# Patient Record
Sex: FEMALE | Race: WHITE | NOT HISPANIC OR LATINO | URBAN - METROPOLITAN AREA
[De-identification: names, ages, dates, MRNs, and addresses within clinical notes are randomized per-mention and may not be internally consistent; named-entity substitution may affect disease eponyms.]

---

## 2020-06-01 ENCOUNTER — OFFICE (OUTPATIENT)
Dept: URBAN - METROPOLITAN AREA CLINIC 19 | Facility: CLINIC | Age: 85
End: 2020-06-01
Payer: MEDICARE

## 2020-06-01 VITALS
HEART RATE: 63 BPM | SYSTOLIC BLOOD PRESSURE: 123 MMHG | WEIGHT: 110 LBS | DIASTOLIC BLOOD PRESSURE: 51 MMHG | HEIGHT: 66 IN

## 2020-06-01 DIAGNOSIS — R10.9 UNSPECIFIED ABDOMINAL PAIN: ICD-10-CM

## 2020-06-01 DIAGNOSIS — K86.2 CYST OF PANCREAS: ICD-10-CM

## 2020-06-01 DIAGNOSIS — R19.8 OTHER SPECIFIED SYMPTOMS AND SIGNS INVOLVING THE DIGESTIVE S: ICD-10-CM

## 2020-06-01 DIAGNOSIS — R13.10 DYSPHAGIA, UNSPECIFIED: ICD-10-CM

## 2020-06-01 DIAGNOSIS — D63.8 ANEMIA IN OTHER CHRONIC DISEASES CLASSIFIED ELSEWHERE: ICD-10-CM

## 2020-06-01 LAB
AMYLASE: 260 U/L — HIGH (ref 31–110)
CBC, PLATELET, NO DIFFERENTIAL: HEMATOCRIT: 32.8 % — LOW (ref 34–46.6)
CBC, PLATELET, NO DIFFERENTIAL: HEMOGLOBIN: 10.6 G/DL — LOW (ref 11.1–15.9)
CBC, PLATELET, NO DIFFERENTIAL: MCH: 29.7 PG (ref 26.6–33)
CBC, PLATELET, NO DIFFERENTIAL: MCHC: 32.3 G/DL (ref 31.5–35.7)
CBC, PLATELET, NO DIFFERENTIAL: MCV: 92 FL (ref 79–97)
CBC, PLATELET, NO DIFFERENTIAL: NRBC: (no result)
CBC, PLATELET, NO DIFFERENTIAL: PLATELETS: (no result) X10E3/UL
CBC, PLATELET, NO DIFFERENTIAL: RBC: 3.57 X10E6/UL — LOW (ref 3.77–5.28)
CBC, PLATELET, NO DIFFERENTIAL: RDW: 13.8 % (ref 11.7–15.4)
CBC, PLATELET, NO DIFFERENTIAL: WBC: 4.9 X10E3/UL (ref 3.4–10.8)
COMP. METABOLIC PANEL (14): A/G RATIO: 1.8 (ref 1.2–2.2)
COMP. METABOLIC PANEL (14): ALBUMIN: 4.2 G/DL (ref 3.5–4.6)
COMP. METABOLIC PANEL (14): ALKALINE PHOSPHATASE: 34 IU/L — LOW (ref 39–117)
COMP. METABOLIC PANEL (14): ALT (SGPT): 16 IU/L (ref 0–32)
COMP. METABOLIC PANEL (14): AST (SGOT): 22 IU/L (ref 0–40)
COMP. METABOLIC PANEL (14): BILIRUBIN, TOTAL: 0.3 MG/DL (ref 0–1.2)
COMP. METABOLIC PANEL (14): BUN/CREATININE RATIO: 23 (ref 12–28)
COMP. METABOLIC PANEL (14): BUN: 14 MG/DL (ref 10–36)
COMP. METABOLIC PANEL (14): CALCIUM: 9.5 MG/DL (ref 8.7–10.3)
COMP. METABOLIC PANEL (14): CARBON DIOXIDE, TOTAL: 28 MMOL/L (ref 20–29)
COMP. METABOLIC PANEL (14): CHLORIDE: 102 MMOL/L (ref 96–106)
COMP. METABOLIC PANEL (14): CREATININE: 0.61 MG/DL (ref 0.57–1)
COMP. METABOLIC PANEL (14): EGFR IF AFRICN AM: 92 ML/MIN/1.73 (ref 59–?)
COMP. METABOLIC PANEL (14): EGFR IF NONAFRICN AM: 80 ML/MIN/1.73 (ref 59–?)
COMP. METABOLIC PANEL (14): GLOBULIN, TOTAL: 2.3 G/DL (ref 1.5–4.5)
COMP. METABOLIC PANEL (14): GLUCOSE: 112 MG/DL — HIGH (ref 65–99)
COMP. METABOLIC PANEL (14): POTASSIUM: 4.1 MMOL/L (ref 3.5–5.2)
COMP. METABOLIC PANEL (14): PROTEIN, TOTAL: 6.5 G/DL (ref 6–8.5)
COMP. METABOLIC PANEL (14): SODIUM: 143 MMOL/L (ref 134–144)
HEMATOLOGY COMMENTS: (no result)
LIPASE: 34 U/L (ref 14–85)

## 2020-06-01 PROCEDURE — 99204 OFFICE O/P NEW MOD 45 MIN: CPT

## 2020-06-01 NOTE — SERVICEHPINOTES
89-year-old white female returns with her daughter for evaluation of a nonspecific cyst found on recent CT abdomen/pelvis.  For the last 2-4 weeks she has had right-sided abdominal pain radiating to her back.  She initially presented to her PCP, Jill Khalil MD who apparently geena routine blood work including pancreatic enzymes and ordered a CT abdomen/pelvis (records requested).  She did have an elevated amylase and CT abdomen/pelvis on 5/22/20 found “interval development of a small 13 x 14 millimeter cystic focus along the edge of the anterior/inferior pancreatic head.  This may represent a small pseudocyst but is nonspecific.  Alternatively, it could represent an IPMN.  Consider follow-up MRCP/pancreatic protocol to evaluate both the character of this cyst, and the potential for an occult common duct stone which could have caused an episode of pancreatitis.” Around 2-3 weeks ago, when this abdominal pain began, it was acute, moderate to severe and constant.  It has significantly improved in the last few weeks and is more intermittent and less severe.  She also reports a change in her bowel habits over the last several months.  She initially started out with “dark, black diarrhea” that is now transitioned to “pink colored stool” for the last 2-4 weeks.  She reports incomplete emptying and has 1-2 bowel movements a day.  She has had some progressive dysphagia, but denies aspiration.  She denies any unintentional weight loss or loss of appetite.  She denies reflux, heartburn or nausea. She denies NSAID use.  She thinks she has a remote history of PUD, but does not remember details.She was last here for colonoscopy 4/1/16 which found diverticulosis coli and removed a small inflammatory polyp. 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 She developed some rib pain, and a PET scan 2/25/16 was unremarkable except for "focal increased activity in the proximal ascending colon with suggestion of wall thickening". This was compared to a unremarkable PET scan 11/12/15. She has some fecal urgency and passes 2-3 "mushy" stools per day. Colonoscopy 9/12/09 found only diverticulosis coli. There was a mild increase in lymphocytes on random colon biopsies, but this was not definitive for microscopic colitis. Collagenous colitis was found via random biopsies on colonoscopy 11/1/99. A GI workup in 2003 included "extended" upper endoscopy and colonoscopy (no random colon biopsies taken), CT abd/pelvis and SBFT which found diverticula, esophagogastritis and hemorrhoids. Family history is negative for IBD or colon neoplasm.

## 2020-06-01 NOTE — SERVICENOTES
The patient's assessment was reviewed with Dr. Lopez and a collaborative plan of care was established.

## 2020-06-10 ENCOUNTER — OFFICE (OUTPATIENT)
Dept: URBAN - METROPOLITAN AREA CLINIC 19 | Facility: CLINIC | Age: 85
End: 2020-06-10

## 2020-06-11 ENCOUNTER — OFFICE (OUTPATIENT)
Dept: URBAN - METROPOLITAN AREA CLINIC 19 | Facility: CLINIC | Age: 85
End: 2020-06-11
Payer: MEDICARE

## 2020-06-11 DIAGNOSIS — R19.4 CHANGE IN BOWEL HABIT: ICD-10-CM

## 2020-06-11 PROCEDURE — 82272 OCCULT BLD FECES 1-3 TESTS: CPT

## 2020-06-30 ENCOUNTER — AMBULATORY SURGICAL CENTER (OUTPATIENT)
Dept: URBAN - METROPOLITAN AREA SURGERY 3 | Facility: SURGERY | Age: 85
End: 2020-06-30
Payer: MEDICARE

## 2020-06-30 ENCOUNTER — AMBULATORY SURGICAL CENTER (OUTPATIENT)
Dept: URBAN - METROPOLITAN AREA SURGERY 3 | Facility: SURGERY | Age: 85
End: 2020-06-30
Payer: OTHER GOVERNMENT

## 2020-06-30 ENCOUNTER — OFFICE (OUTPATIENT)
Dept: URBAN - METROPOLITAN AREA PATHOLOGY 22 | Facility: PATHOLOGY | Age: 85
End: 2020-06-30
Payer: OTHER GOVERNMENT

## 2020-06-30 VITALS
DIASTOLIC BLOOD PRESSURE: 63 MMHG | HEIGHT: 66 IN | HEIGHT: 66 IN | HEART RATE: 70 BPM | RESPIRATION RATE: 15 BRPM | HEART RATE: 67 BPM | TEMPERATURE: 97.3 F | SYSTOLIC BLOOD PRESSURE: 148 MMHG | WEIGHT: 104 LBS | SYSTOLIC BLOOD PRESSURE: 112 MMHG | SYSTOLIC BLOOD PRESSURE: 156 MMHG | HEART RATE: 66 BPM | DIASTOLIC BLOOD PRESSURE: 63 MMHG | DIASTOLIC BLOOD PRESSURE: 62 MMHG | RESPIRATION RATE: 16 BRPM | SYSTOLIC BLOOD PRESSURE: 154 MMHG | OXYGEN SATURATION: 99 % | HEART RATE: 66 BPM | DIASTOLIC BLOOD PRESSURE: 45 MMHG | HEART RATE: 67 BPM | SYSTOLIC BLOOD PRESSURE: 112 MMHG | TEMPERATURE: 97.3 F | TEMPERATURE: 97.2 F | SYSTOLIC BLOOD PRESSURE: 124 MMHG | DIASTOLIC BLOOD PRESSURE: 45 MMHG | HEART RATE: 70 BPM | RESPIRATION RATE: 18 BRPM | SYSTOLIC BLOOD PRESSURE: 124 MMHG | DIASTOLIC BLOOD PRESSURE: 64 MMHG | WEIGHT: 104 LBS | DIASTOLIC BLOOD PRESSURE: 65 MMHG | OXYGEN SATURATION: 100 % | OXYGEN SATURATION: 100 % | RESPIRATION RATE: 17 BRPM | RESPIRATION RATE: 18 BRPM | RESPIRATION RATE: 16 BRPM | DIASTOLIC BLOOD PRESSURE: 64 MMHG | DIASTOLIC BLOOD PRESSURE: 62 MMHG | OXYGEN SATURATION: 99 % | RESPIRATION RATE: 17 BRPM | DIASTOLIC BLOOD PRESSURE: 65 MMHG | HEART RATE: 68 BPM | HEART RATE: 68 BPM | SYSTOLIC BLOOD PRESSURE: 156 MMHG | SYSTOLIC BLOOD PRESSURE: 154 MMHG | TEMPERATURE: 97.2 F | SYSTOLIC BLOOD PRESSURE: 148 MMHG | HEART RATE: 75 BPM | HEART RATE: 75 BPM | RESPIRATION RATE: 15 BRPM

## 2020-06-30 DIAGNOSIS — K31.89 OTHER DISEASES OF STOMACH AND DUODENUM: ICD-10-CM

## 2020-06-30 DIAGNOSIS — K20.9 ESOPHAGITIS, UNSPECIFIED: ICD-10-CM

## 2020-06-30 DIAGNOSIS — R13.10 DYSPHAGIA, UNSPECIFIED: ICD-10-CM

## 2020-06-30 PROBLEM — R10.9: Status: ACTIVE | Noted: 2020-06-30

## 2020-06-30 PROBLEM — K20.8 OTHER ESOPHAGITIS: Status: ACTIVE | Noted: 2020-06-30

## 2020-06-30 LAB
AMYLASE: 243 U/L — HIGH (ref 31–110)
AMYLASE: 243 U/L — HIGH (ref 31–110)
LIPASE: 65 U/L (ref 14–85)
LIPASE: 65 U/L (ref 14–85)

## 2020-06-30 PROCEDURE — 88342 IMHCHEM/IMCYTCHM 1ST ANTB: CPT | Performed by: INTERNAL MEDICINE

## 2020-06-30 PROCEDURE — 88305 TISSUE EXAM BY PATHOLOGIST: CPT | Performed by: INTERNAL MEDICINE

## 2020-06-30 PROCEDURE — 43248 EGD GUIDE WIRE INSERTION: CPT | Performed by: INTERNAL MEDICINE

## 2020-06-30 PROCEDURE — 88313 SPECIAL STAINS GROUP 2: CPT | Performed by: INTERNAL MEDICINE

## 2020-06-30 PROCEDURE — 43239 EGD BIOPSY SINGLE/MULTIPLE: CPT | Mod: 59 | Performed by: INTERNAL MEDICINE

## 2020-06-30 PROCEDURE — G8907 PT DOC NO EVENTS ON DISCHARG: HCPCS | Performed by: INTERNAL MEDICINE

## 2020-06-30 PROCEDURE — G8918 PT W/O PREOP ORDER IV AB PRO: HCPCS | Performed by: INTERNAL MEDICINE

## 2020-06-30 RX ORDER — PANTOPRAZOLE SODIUM 40 MG/1
40 TABLET, DELAYED RELEASE ORAL
Qty: 30 | Refills: 5 | Status: ACTIVE
Start: 2020-06-30

## 2020-07-24 ENCOUNTER — OFFICE (OUTPATIENT)
Dept: URBAN - METROPOLITAN AREA CLINIC 19 | Facility: CLINIC | Age: 85
End: 2020-07-24

## 2020-07-24 VITALS
SYSTOLIC BLOOD PRESSURE: 143 MMHG | HEIGHT: 66 IN | HEART RATE: 72 BPM | DIASTOLIC BLOOD PRESSURE: 50 MMHG | WEIGHT: 106 LBS

## 2020-07-24 DIAGNOSIS — K21.9 GASTRO-ESOPHAGEAL REFLUX DISEASE WITHOUT ESOPHAGITIS: ICD-10-CM

## 2020-07-24 DIAGNOSIS — K92.1 MELENA: ICD-10-CM

## 2020-07-24 DIAGNOSIS — R10.9 UNSPECIFIED ABDOMINAL PAIN: ICD-10-CM

## 2020-07-24 DIAGNOSIS — K52.89 OTHER SPECIFIED NONINFECTIVE GASTROENTERITIS AND COLITIS: ICD-10-CM

## 2020-07-24 DIAGNOSIS — D63.8 ANEMIA IN OTHER CHRONIC DISEASES CLASSIFIED ELSEWHERE: ICD-10-CM

## 2020-07-24 DIAGNOSIS — K86.2 CYST OF PANCREAS: ICD-10-CM

## 2020-07-24 LAB
CBC, PLATELET, NO DIFFERENTIAL: HEMATOCRIT: 35.8 % (ref 34–46.6)
CBC, PLATELET, NO DIFFERENTIAL: HEMOGLOBIN: 11.6 G/DL (ref 11.1–15.9)
CBC, PLATELET, NO DIFFERENTIAL: MCH: 29.7 PG (ref 26.6–33)
CBC, PLATELET, NO DIFFERENTIAL: MCHC: 32.4 G/DL (ref 31.5–35.7)
CBC, PLATELET, NO DIFFERENTIAL: MCV: 92 FL (ref 79–97)
CBC, PLATELET, NO DIFFERENTIAL: NRBC: (no result)
CBC, PLATELET, NO DIFFERENTIAL: PLATELETS: 48 X10E3/UL — CRITICAL LOW (ref 150–450)
CBC, PLATELET, NO DIFFERENTIAL: RBC: 3.9 X10E6/UL (ref 3.77–5.28)
CBC, PLATELET, NO DIFFERENTIAL: RDW: 12.2 % (ref 11.7–15.4)
CBC, PLATELET, NO DIFFERENTIAL: WBC: 5.9 X10E3/UL (ref 3.4–10.8)
HEMATOLOGY COMMENTS: (no result)

## 2020-07-24 PROCEDURE — 99214 OFFICE O/P EST MOD 30 MIN: CPT

## 2020-07-24 NOTE — SERVICEHPINOTES
89-year-old white female returns for an acute change in bowel habits.  Four days ago she started having dark, “black” stools and diarrhea.  She denies fever, abdominal pain or bright red blood in her stool.  Pepto-Bismol has helped her symptoms.  She denies recent antibiotic exposure or travel.  She denies NSAID use and has GERD that has recently well controlled on pantoprazole 40 mg daily.I actually saw her most recently on 6/1/20 with her daughter to evaluate a 1 month history of nonspecific right-sided abdominal pain radiating to her back and dysphagia. CT abdomen/pelvis with her PCP, Jill Khalil MD on 5/22/20 found a suspicious cyst to the head of her pancreas. Labwork at the time was remarkable for elevated amylase (260).  MRCP 6/2/20 found a 2.5 cm cyst in the head of the pancreas (? IPMN) which is slightly larger in size as compared with cysts found on PET scans in 2016 and 2018. Pancreatic elastase and  was normal on 6/10/20. FOBT x 3 were all negative for occult blood. EGD on 6/30/20 found reflux esophagitis, peptic esophageal stricture (dilated) and gastritis.  Biopsies were negative for H pylori.  Her abdominal pain has “mostly resolved”, but occasionally causes discomfort.  She had been having a mild change in bowel habit at that time as well with “dark, black diarrhea”, but this apparently resolved.  We had discussed adding a colonoscopy (vs FSC) to her EGD at the time, but decided this is not necessary considering her age and she preferred not to undergo this test unless she absolutely necessary.Colonoscopy 4/1/16 found diverticulosis coli and removed a small inflammatory polyp. 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 She developed some rib pain, and a PET scan 2/25/16 was unremarkable except for "focal increased activity in the proximal ascending colon with suggestion of wall thickening". This was compared to a unremarkable PET scan 11/12/15. She has some fecal urgency and passes 2-3 "mushy" stools per day. Colonoscopy 9/12/09 found only diverticulosis coli. There was a mild increase in lymphocytes on random colon biopsies, but this was not definitive for microscopic colitis. Collagenous colitis was found via random biopsies on colonoscopy 11/1/99. A GI workup in 2003 included "extended" upper endoscopy and colonoscopy (no random colon biopsies taken), CT abd/pelvis and SBFT which found diverticula, esophagogastritis and hemorrhoids. Family history is negative for IBD or colon neoplasm.

## 2020-08-17 ENCOUNTER — OFFICE (OUTPATIENT)
Dept: URBAN - METROPOLITAN AREA CLINIC 19 | Facility: CLINIC | Age: 85
End: 2020-08-17

## 2020-08-17 VITALS
DIASTOLIC BLOOD PRESSURE: 48 MMHG | HEART RATE: 62 BPM | WEIGHT: 109 LBS | HEIGHT: 66 IN | SYSTOLIC BLOOD PRESSURE: 143 MMHG | OXYGEN SATURATION: 84 %

## 2020-08-17 DIAGNOSIS — R10.9 UNSPECIFIED ABDOMINAL PAIN: ICD-10-CM

## 2020-08-17 DIAGNOSIS — K86.2 CYST OF PANCREAS: ICD-10-CM

## 2020-08-17 DIAGNOSIS — D63.8 ANEMIA IN OTHER CHRONIC DISEASES CLASSIFIED ELSEWHERE: ICD-10-CM

## 2020-08-17 DIAGNOSIS — K21.9 GASTRO-ESOPHAGEAL REFLUX DISEASE WITHOUT ESOPHAGITIS: ICD-10-CM

## 2020-08-17 DIAGNOSIS — R19.4 CHANGE IN BOWEL HABIT: ICD-10-CM

## 2020-08-17 PROCEDURE — 99213 OFFICE O/P EST LOW 20 MIN: CPT

## 2020-08-17 NOTE — SERVICEHPINOTES
89-year-old white female returns with her daughter for routine follow-up of her diarrhea.  I saw her on 7/24/20 after acute change in her bowel habits where she was having dark, “black” stools and diarrhea.  She denied fever, abdominal pain or bright red blood in her stools.  When I saw her she had been taking Pepto-Bismol for the last few days which seem to improve her symptoms.  CBC was unremarkable (platelets were low, but sample was apparently hemolyzed).  She was given a stool kit, but her bowel movements returned to normal. She has intermittent diarrhea since this time, but seems to be well managed on Pepto-Bismol as well as a unnamed medication that her PCP gave her last week.  Routine blood work with Dr. Khalil was apparently unremarkable from last week (records requested).  Her reflux is well managed on pantoprazole 40 mg daily.  She has mild chronic dysphagia, but this is improved since she was dilated via EGD 6/30/20. Her abdominal pain has “mostly resolved”, but occasionally causes discomfort.She initially presented on 6/1/20 with her daughter to evaluate a 1 month history of nonspecific right-sided abdominal pain radiating to her back and dysphagia. CT abdomen/pelvis with her PCP, Jill Khalil MD on 5/22/20 found a suspicious cyst to the head of her pancreas. Labwork at the time was remarkable for elevated amylase (260). MRCP 6/2/20 found a 2.5 cm cyst in the head of the pancreas (? IPMN) which is slightly larger in size as compared with cysts found on PET scans in 2016 and 2018. Pancreatic elastase and  was normal on 6/10/20. FOBT x 3 were all negative for occult blood. EGD on 6/30/20 found reflux esophagitis, peptic esophageal stricture (dilated) and gastritis (Bx negative for H pylori).Colonoscopy 4/1/16 found diverticulosis coli and removed a small inflammatory polyp. 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 She developed some rib pain, and a PET scan 2/25/16 was unremarkable except for "focal increased activity in the proximal ascending colon with suggestion of wall thickening". This was compared to a unremarkable PET scan 11/12/15. She has some fecal urgency and passes 2-3 "mushy" stools per day. Colonoscopy 9/12/09 found only diverticulosis coli. There was a mild increase in lymphocytes on random colon biopsies, but this was not definitive for microscopic colitis. Collagenous colitis was found via random biopsies on colonoscopy 11/1/99. A GI workup in 2003 included "extended" upper endoscopy and colonoscopy (no random colon biopsies taken), CT abd/pelvis and SBFT which found diverticula, esophagogastritis and hemorrhoids. Family history is negative for IBD or colon neoplasm.

## 2020-11-20 ENCOUNTER — OFFICE (OUTPATIENT)
Dept: URBAN - METROPOLITAN AREA CLINIC 19 | Facility: CLINIC | Age: 85
End: 2020-11-20
Payer: OTHER GOVERNMENT

## 2020-11-20 VITALS
WEIGHT: 109 LBS | SYSTOLIC BLOOD PRESSURE: 143 MMHG | OXYGEN SATURATION: 99 % | HEART RATE: 65 BPM | HEIGHT: 66 IN | DIASTOLIC BLOOD PRESSURE: 58 MMHG

## 2020-11-20 DIAGNOSIS — R10.31 RIGHT LOWER QUADRANT PAIN: ICD-10-CM

## 2020-11-20 DIAGNOSIS — R93.3 ABNORMAL FINDINGS ON DIAGNOSTIC IMAGING OF OTHER PARTS OF DI: ICD-10-CM

## 2020-11-20 PROCEDURE — 99214 OFFICE O/P EST MOD 30 MIN: CPT | Performed by: INTERNAL MEDICINE

## 2020-11-20 RX ORDER — SODIUM PICOSULFATE, MAGNESIUM OXIDE, AND ANHYDROUS CITRIC ACID 10; 3.5; 12 MG/160ML; G/160ML; G/160ML
LIQUID ORAL
Qty: 1 | Refills: 0 | Status: COMPLETED
Start: 2020-11-20 | End: 2020-12-30

## 2020-12-30 ENCOUNTER — AMBULATORY SURGICAL CENTER (OUTPATIENT)
Dept: URBAN - METROPOLITAN AREA SURGERY 2 | Facility: SURGERY | Age: 85
End: 2020-12-30
Payer: OTHER GOVERNMENT

## 2020-12-30 ENCOUNTER — OFFICE (OUTPATIENT)
Dept: URBAN - METROPOLITAN AREA PATHOLOGY 22 | Facility: PATHOLOGY | Age: 85
End: 2020-12-30
Payer: OTHER GOVERNMENT

## 2020-12-30 ENCOUNTER — AMBULATORY SURGICAL CENTER (OUTPATIENT)
Dept: URBAN - METROPOLITAN AREA SURGERY 2 | Facility: SURGERY | Age: 85
End: 2020-12-30
Payer: MEDICARE

## 2020-12-30 VITALS
TEMPERATURE: 97.6 F | DIASTOLIC BLOOD PRESSURE: 66 MMHG | HEART RATE: 68 BPM | DIASTOLIC BLOOD PRESSURE: 51 MMHG | RESPIRATION RATE: 20 BRPM | DIASTOLIC BLOOD PRESSURE: 51 MMHG | HEART RATE: 65 BPM | HEIGHT: 66 IN | DIASTOLIC BLOOD PRESSURE: 66 MMHG | OXYGEN SATURATION: 98 % | OXYGEN SATURATION: 99 % | SYSTOLIC BLOOD PRESSURE: 119 MMHG | HEART RATE: 64 BPM | HEART RATE: 64 BPM | OXYGEN SATURATION: 99 % | DIASTOLIC BLOOD PRESSURE: 45 MMHG | OXYGEN SATURATION: 100 % | HEIGHT: 66 IN | SYSTOLIC BLOOD PRESSURE: 134 MMHG | OXYGEN SATURATION: 98 % | HEART RATE: 65 BPM | RESPIRATION RATE: 17 BRPM | OXYGEN SATURATION: 98 % | HEART RATE: 68 BPM | WEIGHT: 105 LBS | WEIGHT: 105 LBS | TEMPERATURE: 98 F | DIASTOLIC BLOOD PRESSURE: 50 MMHG | DIASTOLIC BLOOD PRESSURE: 51 MMHG | SYSTOLIC BLOOD PRESSURE: 134 MMHG | RESPIRATION RATE: 20 BRPM | DIASTOLIC BLOOD PRESSURE: 45 MMHG | RESPIRATION RATE: 17 BRPM | SYSTOLIC BLOOD PRESSURE: 119 MMHG | DIASTOLIC BLOOD PRESSURE: 66 MMHG | TEMPERATURE: 97.6 F | OXYGEN SATURATION: 99 % | RESPIRATION RATE: 20 BRPM | SYSTOLIC BLOOD PRESSURE: 119 MMHG | SYSTOLIC BLOOD PRESSURE: 116 MMHG | DIASTOLIC BLOOD PRESSURE: 59 MMHG | DIASTOLIC BLOOD PRESSURE: 50 MMHG | OXYGEN SATURATION: 100 % | WEIGHT: 105 LBS | TEMPERATURE: 97.6 F | SYSTOLIC BLOOD PRESSURE: 120 MMHG | TEMPERATURE: 98 F | OXYGEN SATURATION: 100 % | HEIGHT: 66 IN | TEMPERATURE: 98 F | HEART RATE: 64 BPM | DIASTOLIC BLOOD PRESSURE: 45 MMHG | DIASTOLIC BLOOD PRESSURE: 50 MMHG | RESPIRATION RATE: 17 BRPM | HEART RATE: 68 BPM | DIASTOLIC BLOOD PRESSURE: 59 MMHG | DIASTOLIC BLOOD PRESSURE: 59 MMHG | SYSTOLIC BLOOD PRESSURE: 120 MMHG | SYSTOLIC BLOOD PRESSURE: 116 MMHG | SYSTOLIC BLOOD PRESSURE: 134 MMHG | HEART RATE: 65 BPM | SYSTOLIC BLOOD PRESSURE: 116 MMHG | SYSTOLIC BLOOD PRESSURE: 120 MMHG

## 2020-12-30 DIAGNOSIS — K57.30 DIVERTICULOSIS OF LARGE INTESTINE WITHOUT PERFORATION OR ABS: ICD-10-CM

## 2020-12-30 DIAGNOSIS — R93.3 ABNORMAL FINDINGS ON DIAGNOSTIC IMAGING OF OTHER PARTS OF DI: ICD-10-CM

## 2020-12-30 DIAGNOSIS — R19.7 DIARRHEA, UNSPECIFIED: ICD-10-CM

## 2020-12-30 PROBLEM — K52.89 CLINICALLY SIGNIFICANT DIARRHEA OF UNEXPLAINED ORIGIN: Status: ACTIVE | Noted: 2020-12-30

## 2020-12-30 PROCEDURE — G8918 PT W/O PREOP ORDER IV AB PRO: HCPCS | Performed by: INTERNAL MEDICINE

## 2020-12-30 PROCEDURE — G8907 PT DOC NO EVENTS ON DISCHARG: HCPCS | Performed by: INTERNAL MEDICINE

## 2020-12-30 PROCEDURE — 88341 IMHCHEM/IMCYTCHM EA ADD ANTB: CPT | Performed by: INTERNAL MEDICINE

## 2020-12-30 PROCEDURE — 45380 COLONOSCOPY AND BIOPSY: CPT | Performed by: INTERNAL MEDICINE

## 2020-12-30 PROCEDURE — 88305 TISSUE EXAM BY PATHOLOGIST: CPT | Performed by: INTERNAL MEDICINE

## 2020-12-30 PROCEDURE — 88342 IMHCHEM/IMCYTCHM 1ST ANTB: CPT | Performed by: INTERNAL MEDICINE

## 2021-08-09 ENCOUNTER — OFFICE (OUTPATIENT)
Dept: URBAN - METROPOLITAN AREA CLINIC 19 | Facility: CLINIC | Age: 86
End: 2021-08-09
Payer: OTHER GOVERNMENT

## 2021-08-09 VITALS
HEART RATE: 70 BPM | HEIGHT: 66 IN | DIASTOLIC BLOOD PRESSURE: 54 MMHG | OXYGEN SATURATION: 92 % | SYSTOLIC BLOOD PRESSURE: 131 MMHG | WEIGHT: 105 LBS

## 2021-08-09 DIAGNOSIS — K21.9 GASTRO-ESOPHAGEAL REFLUX DISEASE WITHOUT ESOPHAGITIS: ICD-10-CM

## 2021-08-09 DIAGNOSIS — R13.10 DYSPHAGIA, UNSPECIFIED: ICD-10-CM

## 2021-08-09 DIAGNOSIS — K58.9 IRRITABLE BOWEL SYNDROME WITHOUT DIARRHEA: ICD-10-CM

## 2021-08-09 PROCEDURE — 99213 OFFICE O/P EST LOW 20 MIN: CPT

## 2021-08-09 NOTE — SERVICEHPINOTES
89-year-old white female returns with her daughter for continued complaints of occasional abdominal cramping and diarrhea.She was last here for colonoscopy on 12/30/20 which found diverticulosis coli.  Random colon biopsies were negative for microscopic colitis.  This was done to evaluate intermittent right lower quadrant pain over the last several months with diarrhea intermittently. She has a history of breast cancer. PET/CT 11/10/20 found "hypermetabolic abnormal focal uptake in the right colon with suggestion of underlying thickening. Differential considerations include colitis or neoplasm. If clinically warranted correlation with endoscopy is recommended."  She was then referred by her oncologist, Sylvain Davila MD, for further evaluation with GI.Since then, her symptoms have been somewhat manageable.  She occasionally has abdominal cramping and diarrhea.  Routine lab work with oncology recently was stable.  She was encouraged to follow-up here with any additional GI issues, including irritable bowel syndrome.  She denies fever, nausea, reflux ( on PPI ), dysphagia or overt GI bleeding.CT abdomen/pelvis 5/22/20 found to suspicious cyst in the head of the pancreas. MRCP 6/2/20 found a 2.5 cm cyst in the head of the pancreas (? IPMN) which is slightly larger in size as compared with cysts found on PET scans in 2016 and 2018. Pancreatic elastase and  was normal on 6/10/20. FOBT x 3 were all negative for occult blood. EGD on 6/30/20 found reflux esophagitis, peptic esophageal stricture (dilated) and gastritis (Bx negative for H pylori). Colonoscopy 4/1/16 found diverticulosis coli and removed a small inflammatory polyp. I did see her a year ago for complaints of diarrhea. Colonoscopy was discussed, but, given her age and mild symptoms, which seemed well managed at that time with Pepto-Bismol, colonoscopy was not recommended.Her reflux is well managed on pantoprazole 40 mg daily. She has mild chronic dysphagia, but this has improved since she was dilated EGD 6/30/20. 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 She developed some rib pain, and a PET scan 2/25/16 was unremarkable except for "focal increased activity in the proximal ascending colon with suggestion of wall thickening". This was compared to a unremarkable PET scan 11/12/15. She has some fecal urgency and passes 2-3 "mushy" stools per day. Colonoscopy 9/12/09 found only diverticulosis coli. There was a mild increase in lymphocytes on random colon biopsies, but this was not definitive for microscopic colitis. Collagenous colitis was found via random biopsies on colonoscopy 11/1/99. A GI workup in 2003 included "extended" upper endoscopy and colonoscopy (no random colon biopsies taken), CT abd/pelvis and SBFT which found diverticula, esophagogastritis and hemorrhoids. Family history is negative for IBD or colon neoplasm.